# Patient Record
Sex: FEMALE | Race: BLACK OR AFRICAN AMERICAN | URBAN - METROPOLITAN AREA
[De-identification: names, ages, dates, MRNs, and addresses within clinical notes are randomized per-mention and may not be internally consistent; named-entity substitution may affect disease eponyms.]

---

## 2021-11-03 ENCOUNTER — EMERGENCY (EMERGENCY)
Facility: HOSPITAL | Age: 51
LOS: 0 days | Discharge: HOME | End: 2021-11-03
Attending: EMERGENCY MEDICINE
Payer: SELF-PAY

## 2021-11-03 ENCOUNTER — EMERGENCY (EMERGENCY)
Facility: HOSPITAL | Age: 51
LOS: 1 days | Discharge: HOME | End: 2021-11-03
Attending: EMERGENCY MEDICINE | Admitting: EMERGENCY MEDICINE
Payer: MEDICARE

## 2021-11-03 VITALS
OXYGEN SATURATION: 98 % | TEMPERATURE: 96 F | HEART RATE: 92 BPM | DIASTOLIC BLOOD PRESSURE: 95 MMHG | SYSTOLIC BLOOD PRESSURE: 116 MMHG | RESPIRATION RATE: 20 BRPM

## 2021-11-03 VITALS — WEIGHT: 190.04 LBS | HEIGHT: 63 IN

## 2021-11-03 VITALS
RESPIRATION RATE: 17 BRPM | OXYGEN SATURATION: 98 % | HEART RATE: 93 BPM | DIASTOLIC BLOOD PRESSURE: 98 MMHG | TEMPERATURE: 98 F | SYSTOLIC BLOOD PRESSURE: 167 MMHG

## 2021-11-03 DIAGNOSIS — J34.89 OTHER SPECIFIED DISORDERS OF NOSE AND NASAL SINUSES: ICD-10-CM

## 2021-11-03 DIAGNOSIS — R04.0 EPISTAXIS: ICD-10-CM

## 2021-11-03 LAB
ALBUMIN SERPL ELPH-MCNC: 3.8 G/DL — SIGNIFICANT CHANGE UP (ref 3.3–5)
ALP SERPL-CCNC: 52 U/L — SIGNIFICANT CHANGE UP (ref 40–120)
ALT FLD-CCNC: 21 U/L — SIGNIFICANT CHANGE UP (ref 12–78)
ANION GAP SERPL CALC-SCNC: 7 MMOL/L — SIGNIFICANT CHANGE UP (ref 5–17)
APTT BLD: 34.7 SEC — SIGNIFICANT CHANGE UP (ref 27.5–35.5)
AST SERPL-CCNC: 13 U/L — LOW (ref 15–37)
BASOPHILS # BLD AUTO: 0.01 K/UL — SIGNIFICANT CHANGE UP (ref 0–0.2)
BASOPHILS NFR BLD AUTO: 0.2 % — SIGNIFICANT CHANGE UP (ref 0–2)
BILIRUB SERPL-MCNC: 0.4 MG/DL — SIGNIFICANT CHANGE UP (ref 0.2–1.2)
BUN SERPL-MCNC: 11 MG/DL — SIGNIFICANT CHANGE UP (ref 7–23)
CALCIUM SERPL-MCNC: 8.9 MG/DL — SIGNIFICANT CHANGE UP (ref 8.5–10.1)
CHLORIDE SERPL-SCNC: 105 MMOL/L — SIGNIFICANT CHANGE UP (ref 96–108)
CO2 SERPL-SCNC: 27 MMOL/L — SIGNIFICANT CHANGE UP (ref 22–31)
CREAT SERPL-MCNC: 0.61 MG/DL — SIGNIFICANT CHANGE UP (ref 0.5–1.3)
EOSINOPHIL # BLD AUTO: 0.02 K/UL — SIGNIFICANT CHANGE UP (ref 0–0.5)
EOSINOPHIL NFR BLD AUTO: 0.3 % — SIGNIFICANT CHANGE UP (ref 0–6)
GLUCOSE SERPL-MCNC: 90 MG/DL — SIGNIFICANT CHANGE UP (ref 70–99)
HCT VFR BLD CALC: 34 % — LOW (ref 34.5–45)
HGB BLD-MCNC: 12.1 G/DL — SIGNIFICANT CHANGE UP (ref 11.5–15.5)
IMM GRANULOCYTES NFR BLD AUTO: 0.2 % — SIGNIFICANT CHANGE UP (ref 0–1.5)
INR BLD: 1.2 RATIO — HIGH (ref 0.88–1.16)
LYMPHOCYTES # BLD AUTO: 2.69 K/UL — SIGNIFICANT CHANGE UP (ref 1–3.3)
LYMPHOCYTES # BLD AUTO: 43.7 % — SIGNIFICANT CHANGE UP (ref 13–44)
MCHC RBC-ENTMCNC: 29.7 PG — SIGNIFICANT CHANGE UP (ref 27–34)
MCHC RBC-ENTMCNC: 35.6 GM/DL — SIGNIFICANT CHANGE UP (ref 32–36)
MCV RBC AUTO: 83.3 FL — SIGNIFICANT CHANGE UP (ref 80–100)
MONOCYTES # BLD AUTO: 0.47 K/UL — SIGNIFICANT CHANGE UP (ref 0–0.9)
MONOCYTES NFR BLD AUTO: 7.6 % — SIGNIFICANT CHANGE UP (ref 2–14)
NEUTROPHILS # BLD AUTO: 2.96 K/UL — SIGNIFICANT CHANGE UP (ref 1.8–7.4)
NEUTROPHILS NFR BLD AUTO: 48 % — SIGNIFICANT CHANGE UP (ref 43–77)
PLATELET # BLD AUTO: 241 K/UL — SIGNIFICANT CHANGE UP (ref 150–400)
POTASSIUM SERPL-MCNC: 3.9 MMOL/L — SIGNIFICANT CHANGE UP (ref 3.5–5.3)
POTASSIUM SERPL-SCNC: 3.9 MMOL/L — SIGNIFICANT CHANGE UP (ref 3.5–5.3)
PROT SERPL-MCNC: 7.7 GM/DL — SIGNIFICANT CHANGE UP (ref 6–8.3)
PROTHROM AB SERPL-ACNC: 13.8 SEC — HIGH (ref 10.6–13.6)
RBC # BLD: 4.08 M/UL — SIGNIFICANT CHANGE UP (ref 3.8–5.2)
RBC # FLD: 12 % — SIGNIFICANT CHANGE UP (ref 10.3–14.5)
SARS-COV-2 RNA SPEC QL NAA+PROBE: SIGNIFICANT CHANGE UP
SODIUM SERPL-SCNC: 139 MMOL/L — SIGNIFICANT CHANGE UP (ref 135–145)
WBC # BLD: 6.16 K/UL — SIGNIFICANT CHANGE UP (ref 3.8–10.5)
WBC # FLD AUTO: 6.16 K/UL — SIGNIFICANT CHANGE UP (ref 3.8–10.5)

## 2021-11-03 PROCEDURE — 86850 RBC ANTIBODY SCREEN: CPT

## 2021-11-03 PROCEDURE — U0005: CPT

## 2021-11-03 PROCEDURE — 85610 PROTHROMBIN TIME: CPT

## 2021-11-03 PROCEDURE — 86900 BLOOD TYPING SEROLOGIC ABO: CPT

## 2021-11-03 PROCEDURE — 99285 EMERGENCY DEPT VISIT HI MDM: CPT

## 2021-11-03 PROCEDURE — 86901 BLOOD TYPING SEROLOGIC RH(D): CPT

## 2021-11-03 PROCEDURE — 36415 COLL VENOUS BLD VENIPUNCTURE: CPT

## 2021-11-03 PROCEDURE — 85730 THROMBOPLASTIN TIME PARTIAL: CPT

## 2021-11-03 PROCEDURE — U0003: CPT

## 2021-11-03 PROCEDURE — 85025 COMPLETE CBC W/AUTO DIFF WBC: CPT

## 2021-11-03 PROCEDURE — 99283 EMERGENCY DEPT VISIT LOW MDM: CPT

## 2021-11-03 PROCEDURE — 80053 COMPREHEN METABOLIC PANEL: CPT

## 2021-11-03 NOTE — ED PROVIDER NOTE - CROS ED CONS ALL NEG
Patient tolerated infusion well and without incident, central labs sent to lab, port flushed, CADD PUMP connected res volume 108, running and blinking green, patient aware of when to return, avs given negative...

## 2021-11-03 NOTE — ED STATDOCS - CLINICAL SUMMARY MEDICAL DECISION MAKING FREE TEXT BOX
pt w/ vascular nasal lesion w/ packing in place, sent by ENT as pt uninsured for ENT eval/possible biopsy, SW referral for emergency medicaid; d/w SW unable to get pt insurance; will d/w ENT at [facility]

## 2021-11-03 NOTE — ED STATDOCS - OBJECTIVE STATEMENT
52 y/o female with no significant PMHx presents to the ED c/o epistaxis. Pt reports intermittent bleeding from right nare x5 months. Pt reports she was told she has a growth in her right nostril. Pt saw an ENT and was told she needed to have growth removed. No other complaints at this time.

## 2021-11-03 NOTE — ED STATDOCS - PROGRESS NOTE DETAILS
Kamaljit Aquino, PGY3: Spoke w/ [facility] in [place] manager states Lore patient was sent to ED for vascular septal lesion that needs biopsy. Patient does not have insurance so she is being referred for social work for emergent medicaid approval and ENT for biopsy. Kamaljit Aquino, PGY3: Spoke w/ ENT Dr. Lainez at [facility], accepting transfer ED to ED for ENT evaluation. They will decide if patient needs admission for urgent biopsy or if she can be discharged and seen in the clinic. Spoke w/ patient's son Prince and all questions answered. Transfer initiated via transfer center.

## 2021-11-03 NOTE — ED PROVIDER NOTE - OBJECTIVE STATEMENT
51yF w/no stated pmhx transferred to [facility] from [place] for ENT evaluation of right nare growth with epistaxis. Pt reports intermittent nose bleeds the past 5 months, states they occur when she rubs her face. Pt was seen at [place] ED and transferred for ENT evaluation with possible biopsy. She reports difficulty breathing through her nose at night when laying flat. Pt denies fever/chills, blood thinner use, dizziness, lightheadedness, weakness, cough, shortness of breath, cp, throat pain, sinus congestion, headache, abd pain, n/v/d, recent travel or illness or any other concerns.

## 2021-11-03 NOTE — ED PROVIDER NOTE - ATTENDING CONTRIBUTION TO CARE
Attending note:   After face to face evaluation of this patient, I concur with above noted hx, pe, and care plan for this patient.  Vick: 51 yof with intermittent right sided epistaxis. Pt seen at [facility] this AM, had a "camera placed" and then told to go to ED for biopsy. Pt then seen at OSH and TF here for ENT eval for possible biopsy. Pt noted no active bleeding. Pt noted some diff breathing through right side of nose. Pt is well appearing, no distress, right nare less than 1cm soft red mass appears to be arising from medial/septum. oropharynx clear.   Spoke with ENT, pt to be evaluated.

## 2021-11-03 NOTE — ED PROVIDER NOTE - CLINICAL SUMMARY MEDICAL DECISION MAKING FREE TEXT BOX
51yF w/no stated pmhx transferred to [facility] from [place] for ENT evaluation of right nare growth with intermittent epistaxis. On exam pt is well appearing, no active bleeding, vitals stable. Denies pain. Plan: ENT consult

## 2021-11-03 NOTE — ED ADULT NURSE NOTE - OBJECTIVE STATEMENT
pt. c/o epistaxis x months, denies blood thinners, trauma. pt. saw ENT and was told she has growth in right nostril that needs to be removed and was referred.

## 2021-11-03 NOTE — ED PROVIDER NOTE - PROGRESS NOTE DETAILS
FRANK Callahan: Pt reassessed, no epistaxis, resting comfortable, pending ENT consult. FRANK Callahan: Spoke with ENT, will see pt in the ED. FRANK Callahan: Attg  and myself spoke with ENT, will see pt in the ED. FRANK Callahan: Pt signed out to FRANK Leung pending ENT evaluation FRANK Leung: Pt was reassessed, still awaiting on ENT eval. ENT resident unable to leave the OR due to a critical case. Also called Dr. Chopra, awaiting callback. FRANK Leung: Will CDU for CT, ENT re-eval, pt with continued epistaxis.

## 2021-11-03 NOTE — ED ADULT TRIAGE NOTE - CHIEF COMPLAINT QUOTE
Pt c/o nose bleed. Pt reports growth inside right nostril lasting a few months. Pt saw ENT and was told she needed to have growth removed. pt states she has difficulty breathing from growth

## 2021-11-03 NOTE — ED STATDOCS - ATTENDING CONTRIBUTION TO CARE
I personally saw the patient with the PA, and completed the key components of the history and physical exam. I then discussed the management plan with the PA. This note including HPI / ROS / PE / MDM was written by the malouibe jeny Pappas

## 2021-11-03 NOTE — ED STATDOCS - PHYSICAL EXAMINATION
GEN - NAD; well appearing; A+O x3   HEAD - NC/AT     EYES - EOMI, no conjunctival pallor, no scleral icterus  ENT -   mucous membranes  moist , no discharge. Right nare with packing in place. No bleeding in posterior oropharynx.   NECK - Neck supple  PULM - CTA b/l,  symmetric breath sounds  COR -  RRR, S1 S2, no murmurs  ABD - , ND, NT, soft, no guarding, no rebound, no masses    BACK - no CVA tenderness, nontender spine     EXTREMS - no edema, no deformity, warm and well perfused    SKIN - no rash or bruising      NEUROLOGIC - alert, sensation nl, motor 5/5 RUE/LUE/RLE/LLE

## 2021-11-04 ENCOUNTER — RESULT REVIEW (OUTPATIENT)
Age: 51
End: 2021-11-04

## 2021-11-04 VITALS
RESPIRATION RATE: 17 BRPM | HEART RATE: 87 BPM | DIASTOLIC BLOOD PRESSURE: 81 MMHG | SYSTOLIC BLOOD PRESSURE: 132 MMHG | TEMPERATURE: 98 F | OXYGEN SATURATION: 100 %

## 2021-11-04 PROCEDURE — 70491 CT SOFT TISSUE NECK W/DYE: CPT | Mod: 26,MA

## 2021-11-04 PROCEDURE — 99234 HOSP IP/OBS SM DT SF/LOW 45: CPT

## 2021-11-04 PROCEDURE — 70460 CT HEAD/BRAIN W/DYE: CPT | Mod: 26,MA

## 2021-11-04 PROCEDURE — 88305 TISSUE EXAM BY PATHOLOGIST: CPT | Mod: 26

## 2021-11-04 RX ORDER — SODIUM CHLORIDE 9 MG/ML
1000 INJECTION, SOLUTION INTRAVENOUS ONCE
Refills: 0 | Status: COMPLETED | OUTPATIENT
Start: 2021-11-04 | End: 2021-11-04

## 2021-11-04 RX ADMIN — SODIUM CHLORIDE 1000 MILLILITER(S): 9 INJECTION, SOLUTION INTRAVENOUS at 01:27

## 2021-11-04 RX ADMIN — Medication 1 MILLIGRAM(S): at 13:14

## 2021-11-04 NOTE — ED CDU PROVIDER INITIAL DAY NOTE - PROGRESS NOTE DETAILS
FRANK Felix: 50 y/o female with no significant PMHx transferred from [place] for ENT eval of nose bleed and nasal mass.  Pt placed in CDU for CT and ENT eval.  Pt seen and cleared by ENT CT scans reviewed by ENT, pt to follow up outpatient.  Discharge and results reviewed with patient.  Pt feels well, discharge reviewed and discussed with patient. FRANK Felix: 50 y/o female with no significant PMHx transferred from [place] for ENT eval of nose bleed and nasal mass.  Pt placed in CDU for CT and ENT eval.  Pt seen and cleared by ENT CT scans reviewed by ENT, pt to follow up outpatient.  Discharge and results reviewed with patient.  Pt feels well, discharge reviewed and discussed with patient.  S/w Dr Hutson for ENT, pt was evaluated prior to discharge by ENT.

## 2021-11-04 NOTE — ED CDU PROVIDER INITIAL DAY NOTE - NS ED ROS FT
· CONSTITUTIONAL: no fever and no chills.  · ENMT: - - -  · Nose [+]: EPISTAXIS, right nasal growth  · CARDIOVASCULAR: no chest pain and no edema.  · RESPIRATORY: no chest pain, no cough, and no shortness of breath.  · GASTROINTESTINAL: no abdominal pain, no bloating, no constipation, no diarrhea, no nausea and no vomiting.  · MUSCULOSKELETAL: no back pain, no gout, no musculoskeletal pain, no neck pain, and no weakness.  · SKIN: no abrasions, no jaundice, no lesions, no pruritis, and no rashes.  · NEURO: no loss of consciousness, no gait abnormality, no headache, no sensory deficits, and no weakness.  · PSYCHIATRIC: no known mental health issues.  · ROS STATEMENT: all other ROS negative except as per HPI

## 2021-11-04 NOTE — ED CDU PROVIDER DISPOSITION NOTE - PATIENT PORTAL LINK FT
You can access the FollowMyHealth Patient Portal offered by [facility] by registering at the following website: [email or web address]. By joining [facility]’s FollowMyHealth portal, you will also be able to view your health information using other applications (apps) compatible with our system.

## 2021-11-04 NOTE — PROGRESS NOTE ADULT - SUBJECTIVE AND OBJECTIVE BOX
CTs reviewed.  ok to dc with outpatient follow up. we will arrange the appointment.   if she does not hear from us, call [phone].  can use Afrin BID x 3 days with pressure for oozing, and then stop.

## 2021-11-04 NOTE — CONSULT NOTE ADULT - SUBJECTIVE AND OBJECTIVE BOX
HPI:  51yF w/no stated pmhx transferred to [facility] from [place] for ENT evaluation of right nare growth with epistaxis. Pt reports intermittent nose bleeds the past 5 months, states they occur when she rubs her face. Pt was seen at [place] ED and transferred for ENT evaluation with possible biopsy. She reports difficulty breathing through her nose at night when laying flat. Was seen as outpt at ENT and allergy yesterday, told she has a "vascular lesion" of her R septum and sent to ED. Pt denies fever/chills, blood thinner use, dizziness, lightheadedness, weakness, cough, shortness of breath, cp, throat pain, sinus congestion, headache, abd pain, n/v/d, recent travel or illness or any other concerns.        Physical Exam  T(C): 36.7 (11-04-21 @ 00:25), Max: 36.8 (11-03-21 @ 12:50)  HR: 72 (11-04-21 @ 02:43) (71 - 93)  BP: 180/95 (11-04-21 @ 02:43) (116/95 - 185/84)  RR: 16 (11-04-21 @ 02:43) (16 - 20)  SpO2: 100% (11-04-21 @ 02:43) (98% - 100%)  General: NAD, A+Ox3  No respiratory distress, stridor, or stertor  Voice quality: normal  Face:  Symmetric without masses or lesions  OU: EOMI  Nose: L nasal cavity clear, mucosa normal without crusting or bleeding. R nasal cavity with papillomatous lesion along anterior septum, friable, 1cm visible portion. Bx specimen taken for path.   OC/OP: tongue normal, floor of mouth wnl, no masses or lesions, OP clear  Neck: soft/flat  CNII-XII intact      A/P: 51y Female p/w 5 months of R epistaxis, found to have R septal mass. Specimen sent for path.   -f/u pathology  -CT neck including sinuses, sinus protocol  -can f/u outpt, [id number]      --------------------------------------------------------------  Thank you for the consult,    Sharron Duarte MD  Resident  Department of Otolaryngology - Head and Neck Surgery  Peds Page #[phone]  Adult Page #[phone]  --------------------------------------------------------------- HPI:  51yF w/no stated pmhx transferred to [facility] from [place] for ENT evaluation of right nare growth with epistaxis. Pt reports intermittent nose bleeds the past 5 months, states they occur when she rubs her face. Pt was seen at [place] ED and transferred for ENT evaluation with possible biopsy. She reports difficulty breathing through her nose at night when laying flat. Was seen as outpt at ENT and allergy yesterday, told she has a "vascular lesion" of her R septum and sent to ED. Pt denies fever/chills, blood thinner use, dizziness, lightheadedness, weakness, cough, shortness of breath, cp, throat pain, sinus congestion, headache, abd pain, n/v/d, recent travel or illness or any other concerns.        Physical Exam  T(C): 36.7 (11-04-21 @ 00:25), Max: 36.8 (11-03-21 @ 12:50)  HR: 72 (11-04-21 @ 02:43) (71 - 93)  BP: 180/95 (11-04-21 @ 02:43) (116/95 - 185/84)  RR: 16 (11-04-21 @ 02:43) (16 - 20)  SpO2: 100% (11-04-21 @ 02:43) (98% - 100%)  General: NAD, A+Ox3  No respiratory distress, stridor, or stertor  Voice quality: normal  Face:  Symmetric without masses or lesions  OU: EOMI  Nose: L nasal cavity clear, mucosa normal without crusting or bleeding. R nasal cavity with papillomatous lesion along anterior septum, friable, 1cm visible portion. Bx specimen taken for path.   OC/OP: tongue normal, floor of mouth wnl, no masses or lesions, OP clear  Neck: soft/flat  CNII-XII intact      A/P: 51y Female p/w 5 months of R epistaxis, found to have R septal mass. Specimen sent for path.   -f/u pathology  -CT neck including sinuses with contrast, sinus protocol  -can f/u outpt, [id number]      --------------------------------------------------------------  Thank you for the consult,    Sharron Duarte MD  Resident  Department of Otolaryngology - Head and Neck Surgery  Peds Page #[phone]  Adult Page #[phone]  ---------------------------------------------------------------

## 2021-11-04 NOTE — ED CDU PROVIDER DISPOSITION NOTE - ATTENDING CONTRIBUTION TO CARE
Dr. Ribeiro:  I performed a face to face bedside interview with patient regarding history of present illness, review of symptoms and past medical history. I completed an independent physical exam.  I have discussed patient's plan of care with PA.   I agree with note as stated above, having amended the EMR as needed to reflect my findings.   This includes HISTORY OF PRESENT ILLNESS, HIV, PAST MEDICAL/SURGICAL/FAMILY/SOCIAL HISTORY, ALLERGIES AND HOME MEDICATIONS, REVIEW OF SYSTEMS, PHYSICAL EXAM, and any PROGRESS NOTES during the time I functioned as the attending physician for this patient.

## 2021-11-04 NOTE — ED CDU PROVIDER INITIAL DAY NOTE - MEDICAL DECISION MAKING DETAILS
50 Y/O F w/ no PMH transferred to [facility] from [place] for ENT evaluation of right nares growth with epistaxis. ENT took biopsy. Recommend CT sinus and neck for further evaluation. Dispo'd to CDU for continued management

## 2021-11-04 NOTE — ED CDU PROVIDER DISPOSITION NOTE - CLINICAL COURSE
FRANK Felix: 50 y/o female with no significant PMHx transferred from [place] for ENT eval of nose bleed and nasal mass.  Pt placed in CDU for CT and ENT eval.  Pt seen and cleared by ENT CT scans reviewed by ENT, pt to follow up outpatient.  Discharge and results reviewed with patient.  Pt feels well, discharge reviewed and discussed with patient.

## 2021-11-04 NOTE — ED CDU PROVIDER INITIAL DAY NOTE - ATTENDING CONTRIBUTION TO CARE
Dr. Ribeiro:  I performed a face to face bedside interview with patient regarding history of present illness, review of symptoms and past medical history. I completed an independent physical exam.  I have discussed patient's plan of care with PA.   I agree with note as stated above, having amended the EMR as needed to reflect my findings.   This includes HISTORY OF PRESENT ILLNESS, HIV, PAST MEDICAL/SURGICAL/FAMILY/SOCIAL HISTORY, ALLERGIES AND HOME MEDICATIONS, REVIEW OF SYSTEMS, PHYSICAL EXAM, and any PROGRESS NOTES during the time I functioned as the attending physician for this patient.    51F transferred from [place] to [facility] for ENT evaluation of growth in right nare & epistaxis.  Pt with no acute complaints this morning.  ENT had recommended CT of area, patient went to CT but too anxious to receive exam.    Exam;  - nad  - rrr  - ctab   -abd soft ntnd    A/P  - right nare growth  - CT (will give anxiolytic prior to exam)  - appreciate ENT recs

## 2021-11-04 NOTE — ED CDU PROVIDER DISPOSITION NOTE - NSFOLLOWUPINSTRUCTIONS_ED_ALL_ED_FT
Follow up with your Primary Medical Doctor in 1-2 days.  Follow Ear Nose and Throat specialist in 1-2 days call to schedule an appointment [phone].  You can use over the counter Afrin 2 x a day for a maximum of 3 days with pressure for any bleeding.  Return to the ER for any persistent/worsening or new symptoms weakness, dizziness, bleeding, fevers, chills, or any concerning symptoms.

## 2021-11-04 NOTE — ED CDU PROVIDER INITIAL DAY NOTE - OBJECTIVE STATEMENT
52 Y/O F w/ no PMH transferred to [facility] from [place] for ENT evaluation of right nares growth with epistaxis. Pt reports intermittent nose bleeds the past 5 months, states they occur when she rubs her face. Pt was seen at [place] ED and transferred for ENT evaluation with possible biopsy. She reports difficulty breathing through her nose at night when laying flat. Pt denies fever/chills, blood thinner use, dizziness, lightheadedness, weakness, cough, shortness of breath, cp, throat pain, sinus congestion, headache, abd pain, n/v/d, recent travel or illness or any other concerns. ENT took biopsy. Recommend CT sinus and neck for further evaluation. Dispo'd to CDU for continued management

## 2021-11-04 NOTE — ED CDU PROVIDER INITIAL DAY NOTE - PHYSICAL EXAMINATION
· Physical Examination:    Nose: No active bleeding, no evidence of bleeding in posterior oropharynx  · CONSTITUTIONAL: Well appearing, awake, alert, oriented to person, place, time/situation and in no apparent distress.  · CARDIAC: Normal rate, regular rhythm.  Heart sounds S1, S2.  · RESPIRATORY: Breath sounds clear and equal bilaterally.  · GASTROINTESTINAL: Abdomen soft, non-tender, no guarding.  · MUSCULOSKELETAL: Spine appears normal, range of motion is not limited, no muscle or joint tenderness  · NEUROLOGICAL: Alert and oriented, no focal deficits, no motor or sensory deficits.  · SKIN: Skin normal color for race, warm, dry and intact. No evidence of rash.

## 2021-11-08 PROBLEM — Z78.9 OTHER SPECIFIED HEALTH STATUS: Chronic | Status: ACTIVE | Noted: 2021-11-03

## 2021-11-10 NOTE — ED POST DISCHARGE NOTE - REASON FOR FOLLOW-UP
[facility] regarding Biopsy report.  They are aware and patient has followed up with them already. Other

## 2021-12-20 ENCOUNTER — APPOINTMENT (OUTPATIENT)
Dept: OTOLARYNGOLOGY | Facility: CLINIC | Age: 51
End: 2021-12-20
Payer: SELF-PAY

## 2021-12-20 VITALS
BODY MASS INDEX: 32.07 KG/M2 | DIASTOLIC BLOOD PRESSURE: 97 MMHG | SYSTOLIC BLOOD PRESSURE: 169 MMHG | HEIGHT: 63 IN | WEIGHT: 181 LBS | HEART RATE: 94 BPM

## 2021-12-20 DIAGNOSIS — J34.89 OTHER SPECIFIED DISORDERS OF NOSE AND NASAL SINUSES: ICD-10-CM

## 2021-12-20 DIAGNOSIS — Z83.3 FAMILY HISTORY OF DIABETES MELLITUS: ICD-10-CM

## 2021-12-20 DIAGNOSIS — Z78.9 OTHER SPECIFIED HEALTH STATUS: ICD-10-CM

## 2021-12-20 DIAGNOSIS — R73.03 PREDIABETES.: ICD-10-CM

## 2021-12-20 PROBLEM — Z00.00 ENCOUNTER FOR PREVENTIVE HEALTH EXAMINATION: Status: ACTIVE | Noted: 2021-12-20

## 2021-12-20 PROCEDURE — 99213 OFFICE O/P EST LOW 20 MIN: CPT

## 2021-12-20 NOTE — REASON FOR VISIT
[Initial Consultation] : an initial consultation for [Family Member] : family member [FreeTextEntry2] : referred by [facility] ED for nasal cyst

## 2021-12-20 NOTE — PHYSICAL EXAM
[Normal] : lingual tonsils are normal [de-identified] : righ sided friable lesion blocking entrance to right nare; left nare clear, turbinates non-hypertrophic [de-identified] : somewhat bloody from right side

## 2021-12-20 NOTE — ASSESSMENT
[FreeTextEntry1] : MRI SINUS\par PATHOLOGY DONE NOVEMBER 4 AT ED SET UP REVIEWED\par WILL CONSIDER SURGICAL MANAGEMENT\par BACITRACIN INTRANASAL BID\par F/U AFTER MRI\par WILL REFER TO DR ROBBINS ACCORDINGLY

## 2021-12-20 NOTE — HISTORY OF PRESENT ILLNESS
[de-identified] : 51 year old female referred by [facility] ED for nasal cyst. Patient reports having difficulty breathing, states right nostril is worse than left. Also with breathing difficulty, getting worse. Recurrent epistaxis with last bleed was within past week. \par CT scan of neck 11/4/2021. Impression: No acute intracranial hemorrhage, mass effect or shift of midline structures. Right sided anterior nasal cavity enhancing mass.

## 2022-01-07 ENCOUNTER — APPOINTMENT (OUTPATIENT)
Dept: MRI IMAGING | Facility: CLINIC | Age: 52
End: 2022-01-07

## 2022-01-14 ENCOUNTER — EMERGENCY (EMERGENCY)
Facility: HOSPITAL | Age: 52
LOS: 0 days | Discharge: HOME | End: 2022-01-14
Attending: EMERGENCY MEDICINE
Payer: SELF-PAY

## 2022-01-14 VITALS
RESPIRATION RATE: 17 BRPM | OXYGEN SATURATION: 100 % | DIASTOLIC BLOOD PRESSURE: 90 MMHG | TEMPERATURE: 98 F | SYSTOLIC BLOOD PRESSURE: 149 MMHG | HEART RATE: 90 BPM

## 2022-01-14 VITALS
TEMPERATURE: 98 F | DIASTOLIC BLOOD PRESSURE: 100 MMHG | OXYGEN SATURATION: 100 % | RESPIRATION RATE: 18 BRPM | HEART RATE: 98 BPM | SYSTOLIC BLOOD PRESSURE: 168 MMHG

## 2022-01-14 DIAGNOSIS — G47.00 INSOMNIA, UNSPECIFIED: ICD-10-CM

## 2022-01-14 DIAGNOSIS — R45.4 IRRITABILITY AND ANGER: ICD-10-CM

## 2022-01-14 DIAGNOSIS — F41.9 ANXIETY DISORDER, UNSPECIFIED: ICD-10-CM

## 2022-01-14 DIAGNOSIS — Z20.822 CONTACT WITH AND (SUSPECTED) EXPOSURE TO COVID-19: ICD-10-CM

## 2022-01-14 LAB
ANION GAP SERPL CALC-SCNC: 4 MMOL/L — LOW (ref 5–17)
APAP SERPL-MCNC: < 2 UG/ML (ref 10–30)
APPEARANCE UR: ABNORMAL
BASOPHILS # BLD AUTO: 0.02 K/UL — SIGNIFICANT CHANGE UP (ref 0–0.2)
BASOPHILS NFR BLD AUTO: 0.3 % — SIGNIFICANT CHANGE UP (ref 0–2)
BILIRUB UR-MCNC: NEGATIVE — SIGNIFICANT CHANGE UP
BUN SERPL-MCNC: 10 MG/DL — SIGNIFICANT CHANGE UP (ref 7–23)
CALCIUM SERPL-MCNC: 9.3 MG/DL — SIGNIFICANT CHANGE UP (ref 8.5–10.1)
CHLORIDE SERPL-SCNC: 107 MMOL/L — SIGNIFICANT CHANGE UP (ref 96–108)
CO2 SERPL-SCNC: 28 MMOL/L — SIGNIFICANT CHANGE UP (ref 22–31)
COLOR SPEC: YELLOW — SIGNIFICANT CHANGE UP
CREAT SERPL-MCNC: 0.66 MG/DL — SIGNIFICANT CHANGE UP (ref 0.5–1.3)
DIFF PNL FLD: ABNORMAL
EOSINOPHIL # BLD AUTO: 0.05 K/UL — SIGNIFICANT CHANGE UP (ref 0–0.5)
EOSINOPHIL NFR BLD AUTO: 0.8 % — SIGNIFICANT CHANGE UP (ref 0–6)
ETHANOL SERPL-MCNC: <10 MG/DL — SIGNIFICANT CHANGE UP (ref 0–10)
GLUCOSE SERPL-MCNC: 114 MG/DL — HIGH (ref 70–99)
GLUCOSE UR QL: NEGATIVE — SIGNIFICANT CHANGE UP
HCG SERPL-ACNC: <1 MIU/ML — SIGNIFICANT CHANGE UP
HCT VFR BLD CALC: 31.7 % — LOW (ref 34.5–45)
HGB BLD-MCNC: 11.2 G/DL — LOW (ref 11.5–15.5)
IMM GRANULOCYTES NFR BLD AUTO: 0.2 % — SIGNIFICANT CHANGE UP (ref 0–1.5)
KETONES UR-MCNC: ABNORMAL
LEUKOCYTE ESTERASE UR-ACNC: ABNORMAL
LYMPHOCYTES # BLD AUTO: 2.37 K/UL — SIGNIFICANT CHANGE UP (ref 1–3.3)
LYMPHOCYTES # BLD AUTO: 36.7 % — SIGNIFICANT CHANGE UP (ref 13–44)
MCHC RBC-ENTMCNC: 29.4 PG — SIGNIFICANT CHANGE UP (ref 27–34)
MCHC RBC-ENTMCNC: 35.3 GM/DL — SIGNIFICANT CHANGE UP (ref 32–36)
MCV RBC AUTO: 83.2 FL — SIGNIFICANT CHANGE UP (ref 80–100)
MONOCYTES # BLD AUTO: 0.5 K/UL — SIGNIFICANT CHANGE UP (ref 0–0.9)
MONOCYTES NFR BLD AUTO: 7.7 % — SIGNIFICANT CHANGE UP (ref 2–14)
NEUTROPHILS # BLD AUTO: 3.51 K/UL — SIGNIFICANT CHANGE UP (ref 1.8–7.4)
NEUTROPHILS NFR BLD AUTO: 54.3 % — SIGNIFICANT CHANGE UP (ref 43–77)
NITRITE UR-MCNC: NEGATIVE — SIGNIFICANT CHANGE UP
PCP SPEC-MCNC: SIGNIFICANT CHANGE UP
PH UR: 5 — SIGNIFICANT CHANGE UP (ref 5–8)
PLATELET # BLD AUTO: 304 K/UL — SIGNIFICANT CHANGE UP (ref 150–400)
POTASSIUM SERPL-MCNC: 3.8 MMOL/L — SIGNIFICANT CHANGE UP (ref 3.5–5.3)
POTASSIUM SERPL-SCNC: 3.8 MMOL/L — SIGNIFICANT CHANGE UP (ref 3.5–5.3)
PROT UR-MCNC: 15
RBC # BLD: 3.81 M/UL — SIGNIFICANT CHANGE UP (ref 3.8–5.2)
RBC # FLD: 12.3 % — SIGNIFICANT CHANGE UP (ref 10.3–14.5)
SALICYLATES SERPL-MCNC: <1.7 MG/DL — LOW (ref 2.8–20)
SARS-COV-2 RNA SPEC QL NAA+PROBE: SIGNIFICANT CHANGE UP
SODIUM SERPL-SCNC: 139 MMOL/L — SIGNIFICANT CHANGE UP (ref 135–145)
SP GR SPEC: 1.02 — SIGNIFICANT CHANGE UP (ref 1.01–1.02)
UROBILINOGEN FLD QL: NEGATIVE — SIGNIFICANT CHANGE UP
WBC # BLD: 6.46 K/UL — SIGNIFICANT CHANGE UP (ref 3.8–10.5)
WBC # FLD AUTO: 6.46 K/UL — SIGNIFICANT CHANGE UP (ref 3.8–10.5)

## 2022-01-14 PROCEDURE — 80307 DRUG TEST PRSMV CHEM ANLYZR: CPT

## 2022-01-14 PROCEDURE — 93005 ELECTROCARDIOGRAM TRACING: CPT

## 2022-01-14 PROCEDURE — 84702 CHORIONIC GONADOTROPIN TEST: CPT

## 2022-01-14 PROCEDURE — 80048 BASIC METABOLIC PNL TOTAL CA: CPT

## 2022-01-14 PROCEDURE — U0005: CPT

## 2022-01-14 PROCEDURE — 93010 ELECTROCARDIOGRAM REPORT: CPT

## 2022-01-14 PROCEDURE — U0003: CPT

## 2022-01-14 PROCEDURE — 99285 EMERGENCY DEPT VISIT HI MDM: CPT

## 2022-01-14 PROCEDURE — 85025 COMPLETE CBC W/AUTO DIFF WBC: CPT

## 2022-01-14 PROCEDURE — 81001 URINALYSIS AUTO W/SCOPE: CPT

## 2022-01-14 PROCEDURE — 36415 COLL VENOUS BLD VENIPUNCTURE: CPT

## 2022-01-14 RX ORDER — HYDROXYZINE HCL 10 MG
50 TABLET ORAL ONCE
Refills: 0 | Status: COMPLETED | OUTPATIENT
Start: 2022-01-14 | End: 2022-01-14

## 2022-01-14 RX ORDER — HYDROXYZINE HCL 10 MG
1 TABLET ORAL
Qty: 42 | Refills: 0
Start: 2022-01-14 | End: 2022-02-03

## 2022-01-14 RX ADMIN — Medication 50 MILLIGRAM(S): at 14:33

## 2022-01-14 NOTE — ED ADULT NURSE REASSESSMENT NOTE - NS ED NURSE REASSESS COMMENT FT1
Patient D/C to home. Ambulating with steady gait. patient calm and cooperative. Patient denies SI and HI/ Verbalized understanding to follow up with outside providers. Belongings returned from [employer].

## 2022-01-14 NOTE — ED PROVIDER NOTE - PROGRESS NOTE DETAILS
Gilbert NIXON for Dr. Vides  Discussed with [facility] psychiatry and stated will see patient. Attending Judith Vides and will send scripts and pt can be d/c.

## 2022-01-14 NOTE — ED BEHAVIORAL HEALTH ASSESSMENT NOTE - MEDICATIONS (PRESCRIPTIONS, DIRECTIONS)
Hydroxyzine 50 mg twice daily as needed for anxiety; Hydroxyzine 50 mg at bedtime for Insomnia. Melatonin 3 mg at bedtime for insomnia.

## 2022-01-14 NOTE — ED PROVIDER NOTE - OBJECTIVE STATEMENT
51 year old female with no pertinent PMHx presents to the ED c/o insomnia, irritability, anxiety, ?HI. Pt states that she cannot sleep, having anxiety, and having irritability. Pt also having episodes of remembering the past and feeling upset about it. Pt also states when she sees her 12 year old son, she has bad thoughts. Denies SI. ?HI. No daily meds. Denies chest pain, SOB, fever, abdominal pain. No other injuries or complaints. +EtOH use. Denies illegal drug use.

## 2022-01-14 NOTE — ED PROVIDER NOTE - NSFOLLOWUPINSTRUCTIONS_ED_ALL_ED_FT
Please call and follow up with your doctor in 1-3 days.  Please call [phone] to find doctors in [facility].    Anxiety    WHAT YOU NEED TO KNOW:    Anxiety is a condition that causes you to feel extremely worried or nervous. The feelings are so strong that they can cause problems with your daily activities or sleep. Anxiety may be triggered by something you fear, or it may happen without a cause. Family or work stress, smoking, caffeine, and alcohol can increase your risk for anxiety. Certain medicines or health conditions can also increase your risk. Anxiety can become a long-term condition if it is not managed or treated.     DISCHARGE INSTRUCTIONS:    Call 911 if:     You have chest pain, tightness, or heaviness that may spread to your shoulders, arms, jaw, neck, or back.      You feel like hurting yourself or someone else.     Contact your healthcare provider if:     Your symptoms get worse or do not get better with treatment.      Your anxiety keeps you from doing your regular daily activities.      You have new symptoms since your last visit.      You have questions or concerns about your condition or care.    Medicines:     Medicines may be given to help you feel more calm and relaxed, and decrease your symptoms.      Take your medicine as directed. Contact your healthcare provider if you think your medicine is not helping or if you have side effects. Tell him of her if you are allergic to any medicine. Keep a list of the medicines, vitamins, and herbs you take. Include the amounts, and when and why you take them. Bring the list or the pill bottles to follow-up visits. Carry your medicine list with you in case of an emergency.    Follow up with your healthcare provider within 2 weeks or as directed: Write down your questions so you remember to ask them during your visits.    Manage anxiety:     Talk to someone about your anxiety. Your healthcare provider may suggest counseling. Cognitive behavioral therapy can help you understand and change how you react to events that trigger your symptoms. You might feel more comfortable talking with a friend or family member about your anxiety. Choose someone you know will be supportive and encouraging.      Find ways to relax. Activities such as exercise, meditation, or listening to music can help you relax. Spend time with friends, or do things you enjoy.      Practice deep breathing. Deep breathing can help you relax when you feel anxious. Focus on taking slow, deep breaths several times a day, or during an anxiety attack. Breathe in through your nose and out through your mouth.       Create a regular sleep routine. Regular sleep can help you feel calmer during the day. Go to sleep and wake up at the same times every day. Do not watch television or use the computer right before bed. Your room should be comfortable, dark, and quiet.       Eat a variety of healthy foods. Healthy foods include fruits, vegetables, low-fat dairy products, lean meats, fish, whole-grain breads, and cooked beans. Healthy foods can help you feel less anxious and have more energy.      Exercise regularly. Exercise can increase your energy level. Exercise may also lift your mood and help you sleep better. Your healthcare provider can help you create an exercise plan.      Do not smoke. Nicotine and other chemicals in cigarettes and cigars can increase anxiety. Ask your healthcare provider for information if you currently smoke and need help to quit. E-cigarettes or smokeless tobacco still contain nicotine. Talk to your healthcare provider before you use these products.       Do not have caffeine. Caffeine can make your symptoms worse. Do not have foods or drinks that are meant to increase your energy level.      Limit or do not drink alcohol. Ask your healthcare provider if alcohol is safe for you. You may not be able to drink alcohol if you take certain anxiety or depression medicines. Limit alcohol to 1 drink per day if you are a woman. Limit alcohol to 2 drinks per day if you are a man. A drink of alcohol is 12 ounces of beer, 5 ounces of wine, or 1½ ounces of liquor.       Do not use drugs. Drugs can make your anxiety worse. It can also make anxiety hard to manage. Talk to your healthcare provider if you use drugs and want help to quit.

## 2022-01-14 NOTE — ED BEHAVIORAL HEALTH ASSESSMENT NOTE - RISK ASSESSMENT
LOW RISK     ACUTE RISK FACTORS: anxiety, depressed mood    PROTECTIVE FACTORS: no prior psychiatric history, no in-patient hospitalization, no prior / current suicidal ideation/intent/plan, no assault ideation/intent/plan or homicidal ideation/intent/plan, future oriented, engaged in safety planning, motivation for psychiatric treatment    Patient symptoms not indicating imminent risk for harm to self; not warranting involuntary in-patient hospitalization Low Acute Suicide Risk

## 2022-01-14 NOTE — ED ADULT NURSE NOTE - OBJECTIVE STATEMENT
The patient is a 51y Female complaining of anxiety. Patient states " I have had trouble sleeping, poor PO intake and thoughts of harming my son".

## 2022-01-14 NOTE — ED STATDOCS - PROGRESS NOTE DETAILS
Gilbert Ordonez for attending Dr. Worthington: 52 y/o female with no significant PMHx presents to the ED for anxiety, worsening over the past few years. Pt states she has . States that anxiety has caused her to lose sleep, decreased appetite, have difficulty leaving the house. Denies SI, reports some violent thoughts toward others including her 11 y/o son, but denies HI. Reports alcohol use to calm the anxiety. Reports drinking almost every day. Denies illegal drug use. LMP: Currently on period, started yesterday. Unsure of last drink, has not drank since December or November. Will send pt to main ED for further evaluation.

## 2022-01-14 NOTE — ED BEHAVIORAL HEALTH ASSESSMENT NOTE - SUMMARY
51 year-old [facility] female, unemployed, homemaker,  is in [place], with no psychiatric history, no suicidal ideation or suicide attempt, self-referred for anxiety with insomnia.     Patient presenting with Anxiety disorder. Patient periods of depressed mood with anhedonia, amotivation, anergia but no hopelessness or suicidal ideation. Patient has no manic / psychotic symptoms. No delusions elicited. Patient is future oriented. Patient engaged in safety planning. Patient motivation for psychotropic management and out-patient psychiatric treatment. Patient symptoms not indicating imminent risk for harm to self; not warranting involuntary in-patient hospitalization.

## 2022-01-14 NOTE — ED BEHAVIORAL HEALTH NOTE - BEHAVIORAL HEALTH NOTE
Rec'd request from CIARA Romero to make referral for outpatient mental health treatment for pt. She lives in [place] and is uninsured. Referred her to the [facility] located at [address] [place] [address]  (Ph. [phone]/ Fax. [phone]). Faxed clinical with request they contact patient at home. Patient provided with contact information as well and advised to bring proof of income for sliding scale fee determination.

## 2022-01-14 NOTE — ED PROVIDER NOTE - ENMT, MLM
Airway patent, Nasal mucosa clear. Mouth with normal mucosa. Throat has no vesicles, no oropharyngeal exudates and uvula is midline. Face mask intact Airway patent. Face mask intact.

## 2022-01-14 NOTE — ED BEHAVIORAL HEALTH ASSESSMENT NOTE - HPI (INCLUDE ILLNESS QUALITY, SEVERITY, DURATION, TIMING, CONTEXT, MODIFYING FACTORS, ASSOCIATED SIGNS AND SYMPTOMS)
51 year-old [facility] female, unemployed, homemaker,  is in [place], with no psychiatric history, no suicidal ideation or suicide attempt, self-referred for anxiety with insomnia.     Reports HPI starting 2011 whilst in [place], where she started experiencing anxiety, with excessive worrying, difficulty with concentration, scattered thinking, intrusive thoughts, insomnia. Reports symptoms have worsened since being back in [place], stating coming back 8.2021 from [place]. Reports at times she has intrusive thoughts I.e. hit your son with a hammer (when she sees a  hammer) however denying assault ideation/intent/plan or homicidal ideation/intent/plan. Reports shock over thinking such thoughts. Reports telling son of her anxiety and insomnia, who recommended ED visit. Reports periods of depressed mood, anergia, amotivation, anhedonia, but no hopelessness or suicidal ideation. Denies manic / psychotic symptoms. No delusions elicited. Reports wanting psychotropic management to take "as needed" when she is having / experiencing anxiety. Denies wanting standing medication.

## 2022-01-14 NOTE — ED PROVIDER NOTE - PATIENT PORTAL LINK FT
You can access the FollowMyHealth Patient Portal offered by [facility] by registering at the following website: [email or web address]. By joining [facility]’s FollowMyHealth portal, you will also be able to view your health information using other applications (apps) compatible with our system.

## 2022-01-14 NOTE — ED STATDOCS - CLINICAL SUMMARY MEDICAL DECISION MAKING FREE TEXT BOX
Denies suicidality, but states "when she looks at her son or other people she thinks about harming them." Medical screening labs were obtained. Will send pt to main ED for psychiatric evaluation.

## 2022-01-14 NOTE — ED ADULT TRIAGE NOTE - CHIEF COMPLAINT QUOTE
pt c/o anxiety, insomnia & inability to see at night. pt denies hallucination, SI or HI. denies PMH. does not see a psychiatrist outpatient.

## 2022-01-14 NOTE — ED STATDOCS - NS_ ATTENDINGSCRIBEDETAILS _ED_A_ED_FT
I, Carmina Worthington MD,  performed the initial face to face bedside interview with this patient regarding history of present illness, review of symptoms and relevant past medical, social and family history.  I completed an independent physical examination.  I was the initial provider who evaluated this patient.   I personally saw the patient and performed a substantive portion of the visit including all aspects of the medical decision making.  I have signed out the follow up of any pending tests (i.e. labs, radiological studies) to the ACP.  I have communicated the patient’s plan of care and disposition with the ACP.  The history, relevant review of systems, past medical and surgical history, medical decision making, and physical examination was documented by the scribe in my presence and I attest to the accuracy of the documentation.

## 2022-01-14 NOTE — ED BEHAVIORAL HEALTH ASSESSMENT NOTE - DESCRIPTION
As per HPI As per HPI     Vital Signs Last 24 Hrs  T(C): 36.7 (14 Jan 2022 10:06), Max: 36.7 (14 Jan 2022 10:06)  T(F): 98.1 (14 Jan 2022 10:06), Max: 98.1 (14 Jan 2022 10:06)  HR: 98 (14 Jan 2022 10:06) (98 - 98)  BP: 168/100 (14 Jan 2022 10:06) (168/100 - 168/100)  BP(mean): 118 (14 Jan 2022 10:06) (118 - 118)  RR: 18 (14 Jan 2022 10:06) (18 - 18)  SpO2: 100% (14 Jan 2022 10:06) (100% - 100%)

## 2022-01-21 ENCOUNTER — APPOINTMENT (OUTPATIENT)
Dept: MRI IMAGING | Facility: CLINIC | Age: 52
End: 2022-01-21

## 2022-01-24 ENCOUNTER — RESULT REVIEW (OUTPATIENT)
Age: 52
End: 2022-01-24

## 2022-01-24 ENCOUNTER — OUTPATIENT (OUTPATIENT)
Dept: OUTPATIENT SERVICES | Facility: HOSPITAL | Age: 52
LOS: 1 days | End: 2022-01-24
Payer: SELF-PAY

## 2022-01-24 ENCOUNTER — APPOINTMENT (OUTPATIENT)
Dept: MRI IMAGING | Facility: CLINIC | Age: 52
End: 2022-01-24
Payer: COMMERCIAL

## 2022-01-24 DIAGNOSIS — J34.89 OTHER SPECIFIED DISORDERS OF NOSE AND NASAL SINUSES: ICD-10-CM

## 2022-01-24 PROCEDURE — 70543 MRI ORBT/FAC/NCK W/O &W/DYE: CPT | Mod: 26

## 2022-01-24 PROCEDURE — 70543 MRI ORBT/FAC/NCK W/O &W/DYE: CPT

## 2022-01-27 ENCOUNTER — APPOINTMENT (OUTPATIENT)
Dept: OTOLARYNGOLOGY | Facility: CLINIC | Age: 52
End: 2022-01-27

## 2022-01-27 RX ORDER — BACITRACIN ZINC 500 [USP'U]/G
500 OINTMENT TOPICAL
Qty: 1 | Refills: 2 | Status: ACTIVE | COMMUNITY
Start: 2022-01-03 | End: 1900-01-01

## 2022-04-04 NOTE — ED ADULT TRIAGE NOTE - 
ADDITIONAL INFORMATION
Dr Tesfaye Miguel: Please review and sign Myorisan 20 mg daily order for [facility]  His e-prescription was class "PRINT" and was not sent to the pharmacy directly  I tried to give them a verbal order to speed up the process, but the pharmacist didn't allow it per Accutane protocol  Thank you  pt received 2 doses of astraveneca

## 2022-11-29 NOTE — CONSULT NOTE ADULT - CONSULT REASON
R nasal mass Erivedge Counseling- I discussed with the patient the risks of Erivedge including but not limited to nausea, vomiting, diarrhea, constipation, weight loss, changes in the sense of taste, decreased appetite, muscle spasms, and hair loss.  The patient verbalized understanding of the proper use and possible adverse effects of Erivedge.  All of the patient's questions and concerns were addressed.

## 2024-01-29 NOTE — ED ADULT NURSE NOTE - NSFALLRSKASSESASSIST_ED_ALL_ED
----- Message from Mariela Albright sent at 1/29/2024  9:42 AM CST -----  Regarding: RETURN CALL  PATIENT CALL AND WOULD LIKE A CALL BACK CALL BACK, CALL BACK NUMBER [phone]    
Pt stated she wanted Dr. Santos to call back after discussing treatment plan.   
no
